# Patient Record
Sex: FEMALE | Race: BLACK OR AFRICAN AMERICAN
[De-identification: names, ages, dates, MRNs, and addresses within clinical notes are randomized per-mention and may not be internally consistent; named-entity substitution may affect disease eponyms.]

---

## 2020-12-02 ENCOUNTER — HOSPITAL ENCOUNTER (EMERGENCY)
Dept: HOSPITAL 63 - ER | Age: 45
Discharge: HOME | End: 2020-12-02
Payer: SELF-PAY

## 2020-12-02 VITALS — WEIGHT: 130.07 LBS | HEIGHT: 64 IN | BODY MASS INDEX: 22.21 KG/M2

## 2020-12-02 VITALS — SYSTOLIC BLOOD PRESSURE: 134 MMHG | DIASTOLIC BLOOD PRESSURE: 98 MMHG

## 2020-12-02 DIAGNOSIS — Z91.012: ICD-10-CM

## 2020-12-02 DIAGNOSIS — Z88.0: ICD-10-CM

## 2020-12-02 DIAGNOSIS — Z90.89: ICD-10-CM

## 2020-12-02 DIAGNOSIS — F17.210: ICD-10-CM

## 2020-12-02 DIAGNOSIS — F41.9: Primary | ICD-10-CM

## 2020-12-02 DIAGNOSIS — F19.90: ICD-10-CM

## 2020-12-02 DIAGNOSIS — F12.90: ICD-10-CM

## 2020-12-02 DIAGNOSIS — Z98.890: ICD-10-CM

## 2020-12-02 DIAGNOSIS — Z91.018: ICD-10-CM

## 2020-12-02 DIAGNOSIS — F14.90: ICD-10-CM

## 2020-12-02 DIAGNOSIS — K21.9: ICD-10-CM

## 2020-12-02 PROCEDURE — 99281 EMR DPT VST MAYX REQ PHY/QHP: CPT

## 2020-12-02 NOTE — PHYS DOC
Past History


Past Medical History:  Anxiety, GERD, Other


 (ELAINE SOTO)


Past Surgical History:  Appendectomy, , Other


 (ELAINE SOTO)


Smoking:  Cigarettes, Greater than 1 pack/day


Alcohol Use:  None


Drug Use:  Cocaine, Marijuana, Methamphetamine


 (ELAINE SOTO)





General Adult


EDM:


Chief Complaint:  ANXIETY/PANIC ATTACK





HPI:


HPI:





Patient is a 45-year-old AA female who presents to the emergency department with

complaints of anxiety since last night.  Patient denies any suicidal or 

homicidal ideations.  She denies any visual or auditory hallucinations.  She 

states that she feels so anxious that she is having a hard time shutting her 

mind off and is having difficulty sleeping.  Patient denies any recent stressors

in her life.  She states that she did smoke some methamphetamine last night.  

Patient denies any fever, cough, shortness of breath, chest pain, abdominal 

pain, nausea, vomiting, diarrhea, headache, vision changes, numbness, tingling, 

or weakness.  She currently denies any pain.


 (ELAINE SOTO)





Review of Systems:


Review of Systems:


Complete ROS is negative unless otherwise noted in HPI.


 (ELAINE SOTO)





Allergies:


Allergies:





Allergies








Coded Allergies Type Severity Reaction Last Updated Verified


 


  Penicillins Allergy Unknown  14 No


 


  banana Allergy Unknown  17 Yes


 


  egg Allergy Unknown  17 Yes








 (ELAINE SOTO)





Physical Exam:


PE:


See Above


Constitutional: Well developed, well nourished, no acute distress, non-toxic 

appearance, thin. []


HENT: Normocephalic, atraumatic, bilateral external ears normal, nose normal. []


Eyes: PERRLA, EOMI, conjunctiva normal, no discharge. [] 


Neck: Normal range of motion, no stridor. [] 


Cardiovascular:Heart rate regular rhythm


Lungs & Thorax:  Respirations even and unlabored, no retractions, no respiratory

 distress


Skin: Warm, dry, no erythema, no rash. [] 


Extremities: No cyanosis, ROM intact, no edema. [] 


Neurologic: Alert and oriented X 3, no focal deficits noted. []


Psychologic: Affect normal, judgement normal, mood anxious


 (ELAINE SOTO)





Current Patient Data:


Vital Signs:





                                   Vital Signs








  Date Time  Temp Pulse Resp B/P (MAP) Pulse Ox O2 Delivery O2 Flow Rate FiO2


 


20 18:40  116 24 134/98 (110) 98   








 (ELAINE SOTO)





EKG:


EKG:


[]


 (ELAINE SOTO)





Radiology/Procedures:


Radiology/Procedures:


[]


 (ELAINE SOTO)





Heart Score:


Risk Factors:


Risk Factors:  DM, Current or recent (<one month) smoker, HTN, HLP, family 

history of CAD, obesity.


Risk Scores:


Score 0 - 3:  2.5% MACE over next 6 weeks - Discharge Home


Score 4 - 6:  20.3% MACE over next 6 weeks - Admit for Clinical Observation


Score 7 - 10:  72.7% MACE over next 6 weeks - Early Invasive Strategies


 (ELAINE SOTO)





Course & Med Decision Making:


Course & Med Decision Making


Pertinent Labs and Imaging studies reviewed. (See chart for details)


45-year-old female presented to the emergency room with reports of anxiety after

 smoking methamphetamine last night.


During my exam the patient's heart rate was in the  range.  Vital signs 

were stable.  The patient denied any acute medical problems.  She denies any 

suicidal or homicidal ideations.  I advised the patient that her anxiety is most

 likely related to her methamphetamine use.  I encouraged her to stop using 

methamphetamine.  Patient states that she does not want treatment for methamp

hetamine addiction at this time.  Patient was medically screened and chose to 

discontinue the visit after speaking with registration.


[]


 (ELAINE SOTO)





Dragon Disclaimer:


Dragon Disclaimer:


This electronic medical record was generated, in whole or in part, using a voice

 recognition dictation system.


 (ELAINE SOTO)





Departure


Departure:


Impression:  


   Primary Impression:  


   Methamphetamine-induced anxiety disorder


   Additional Impression:  


   Encounter for medical screening examination


Disposition:  01 DC HOME SELF CARE/HOMELESS


Condition:  STABLE


Referrals:  


PCP,NO (PCP)





Attending Signature


Attending Signature


I have reviewed the PA/NP's note and plan of care. I was available for 

consultation as needed during the patient's visit in the emergency department. I

 agree with the clinical impression, plan, and disposition.


 (NASIM RADFORD DO)











ELAINE SOTO        Dec 2, 2020 19:27


NASIM RADFORD DO              Dec 3, 2020 00:11